# Patient Record
Sex: FEMALE | Race: WHITE | NOT HISPANIC OR LATINO | Employment: OTHER | URBAN - METROPOLITAN AREA
[De-identification: names, ages, dates, MRNs, and addresses within clinical notes are randomized per-mention and may not be internally consistent; named-entity substitution may affect disease eponyms.]

---

## 2018-01-01 ENCOUNTER — HOSPITAL ENCOUNTER (OUTPATIENT)
Facility: MEDICAL CENTER | Age: 83
End: 2018-05-12
Attending: EMERGENCY MEDICINE | Admitting: HOSPITALIST
Payer: MEDICARE

## 2018-01-01 VITALS
HEART RATE: 38 BPM | WEIGHT: 130.07 LBS | HEIGHT: 61 IN | OXYGEN SATURATION: 96 % | RESPIRATION RATE: 10 BRPM | BODY MASS INDEX: 24.56 KG/M2 | TEMPERATURE: 95.2 F

## 2018-01-01 DIAGNOSIS — I44.2 COMPLETE HEART BLOCK (HCC): ICD-10-CM

## 2018-01-01 LAB
ALBUMIN SERPL BCP-MCNC: 2.8 G/DL (ref 3.2–4.9)
ALBUMIN/GLOB SERPL: 0.9 G/DL
ALP SERPL-CCNC: 87 U/L (ref 30–99)
ALT SERPL-CCNC: 6 U/L (ref 2–50)
ANION GAP SERPL CALC-SCNC: 4 MMOL/L (ref 0–11.9)
AST SERPL-CCNC: 39 U/L (ref 12–45)
BASOPHILS # BLD AUTO: 0.1 % (ref 0–1.8)
BASOPHILS # BLD: 0.01 K/UL (ref 0–0.12)
BILIRUB SERPL-MCNC: 0.3 MG/DL (ref 0.1–1.5)
BUN SERPL-MCNC: 51 MG/DL (ref 8–22)
CALCIUM SERPL-MCNC: 7.8 MG/DL (ref 8.5–10.5)
CHLORIDE SERPL-SCNC: 115 MMOL/L (ref 96–112)
CO2 SERPL-SCNC: 19 MMOL/L (ref 20–33)
CREAT SERPL-MCNC: 2.16 MG/DL (ref 0.5–1.4)
EOSINOPHIL # BLD AUTO: 0.02 K/UL (ref 0–0.51)
EOSINOPHIL NFR BLD: 0.3 % (ref 0–6.9)
ERYTHROCYTE [DISTWIDTH] IN BLOOD BY AUTOMATED COUNT: 49.5 FL (ref 35.9–50)
GLOBULIN SER CALC-MCNC: 3.1 G/DL (ref 1.9–3.5)
GLUCOSE SERPL-MCNC: 131 MG/DL (ref 65–99)
HCT VFR BLD AUTO: 31 % (ref 37–47)
HGB BLD-MCNC: 10.2 G/DL (ref 12–16)
IMM GRANULOCYTES # BLD AUTO: 0.05 K/UL (ref 0–0.11)
IMM GRANULOCYTES NFR BLD AUTO: 0.7 % (ref 0–0.9)
INR PPP: 1.04 (ref 0.87–1.13)
LYMPHOCYTES # BLD AUTO: 0.61 K/UL (ref 1–4.8)
LYMPHOCYTES NFR BLD: 8.6 % (ref 22–41)
MCH RBC QN AUTO: 31.9 PG (ref 27–33)
MCHC RBC AUTO-ENTMCNC: 32.9 G/DL (ref 33.6–35)
MCV RBC AUTO: 96.9 FL (ref 81.4–97.8)
MONOCYTES # BLD AUTO: 0.39 K/UL (ref 0–0.85)
MONOCYTES NFR BLD AUTO: 5.5 % (ref 0–13.4)
NEUTROPHILS # BLD AUTO: 6.04 K/UL (ref 2–7.15)
NEUTROPHILS NFR BLD: 84.8 % (ref 44–72)
NRBC # BLD AUTO: 0 K/UL
NRBC BLD-RTO: 0 /100 WBC
PLATELET # BLD AUTO: 157 K/UL (ref 164–446)
PMV BLD AUTO: 10.4 FL (ref 9–12.9)
POTASSIUM SERPL-SCNC: 6.1 MMOL/L (ref 3.6–5.5)
PROT SERPL-MCNC: 5.9 G/DL (ref 6–8.2)
PROTHROMBIN TIME: 13.3 SEC (ref 12–14.6)
RBC # BLD AUTO: 3.2 M/UL (ref 4.2–5.4)
SODIUM SERPL-SCNC: 138 MMOL/L (ref 135–145)
WBC # BLD AUTO: 7.1 K/UL (ref 4.8–10.8)

## 2018-01-01 PROCEDURE — 700102 HCHG RX REV CODE 250 W/ 637 OVERRIDE(OP): Performed by: HOSPITALIST

## 2018-01-01 PROCEDURE — 700105 HCHG RX REV CODE 258: Performed by: EMERGENCY MEDICINE

## 2018-01-01 PROCEDURE — 96375 TX/PRO/DX INJ NEW DRUG ADDON: CPT

## 2018-01-01 PROCEDURE — 80053 COMPREHEN METABOLIC PANEL: CPT

## 2018-01-01 PROCEDURE — 700101 HCHG RX REV CODE 250: Performed by: EMERGENCY MEDICINE

## 2018-01-01 PROCEDURE — 99220 PR INITIAL OBSERVATION CARE,LEVL III: CPT | Performed by: HOSPITALIST

## 2018-01-01 PROCEDURE — 96366 THER/PROPH/DIAG IV INF ADDON: CPT

## 2018-01-01 PROCEDURE — 700111 HCHG RX REV CODE 636 W/ 250 OVERRIDE (IP): Performed by: INTERNAL MEDICINE

## 2018-01-01 PROCEDURE — A9270 NON-COVERED ITEM OR SERVICE: HCPCS | Performed by: HOSPITALIST

## 2018-01-01 PROCEDURE — 36415 COLL VENOUS BLD VENIPUNCTURE: CPT

## 2018-01-01 PROCEDURE — G0378 HOSPITAL OBSERVATION PER HR: HCPCS

## 2018-01-01 PROCEDURE — 99285 EMERGENCY DEPT VISIT HI MDM: CPT

## 2018-01-01 PROCEDURE — 96365 THER/PROPH/DIAG IV INF INIT: CPT

## 2018-01-01 PROCEDURE — 85610 PROTHROMBIN TIME: CPT

## 2018-01-01 PROCEDURE — 85025 COMPLETE CBC W/AUTO DIFF WBC: CPT

## 2018-01-01 RX ORDER — LACTULOSE 20 G/30ML
10 SOLUTION ORAL
Status: DISCONTINUED | OUTPATIENT
Start: 2018-01-01 | End: 2018-01-01 | Stop reason: HOSPADM

## 2018-01-01 RX ORDER — SCOLOPAMINE TRANSDERMAL SYSTEM 1 MG/1
1 PATCH, EXTENDED RELEASE TRANSDERMAL
Status: DISCONTINUED | OUTPATIENT
Start: 2018-01-01 | End: 2018-01-01 | Stop reason: HOSPADM

## 2018-01-01 RX ORDER — BISACODYL 10 MG
10 SUPPOSITORY, RECTAL RECTAL
Status: DISCONTINUED | OUTPATIENT
Start: 2018-01-01 | End: 2018-01-01 | Stop reason: HOSPADM

## 2018-01-01 RX ORDER — ATROPINE SULFATE 10 MG/ML
2 SOLUTION/ DROPS OPHTHALMIC EVERY 4 HOURS PRN
Status: CANCELLED | OUTPATIENT
Start: 2018-01-01

## 2018-01-01 RX ORDER — ONDANSETRON 8 MG/1
8 TABLET, ORALLY DISINTEGRATING ORAL EVERY 8 HOURS PRN
Status: DISCONTINUED | OUTPATIENT
Start: 2018-01-01 | End: 2018-01-01 | Stop reason: HOSPADM

## 2018-01-01 RX ORDER — POLYVINYL ALCOHOL 14 MG/ML
2 SOLUTION/ DROPS OPHTHALMIC EVERY 6 HOURS PRN
Status: DISCONTINUED | OUTPATIENT
Start: 2018-01-01 | End: 2018-01-01 | Stop reason: HOSPADM

## 2018-01-01 RX ORDER — DOCUSATE SODIUM 100 MG/1
100 CAPSULE, LIQUID FILLED ORAL EVERY 12 HOURS
Status: DISCONTINUED | OUTPATIENT
Start: 2018-01-01 | End: 2018-01-01 | Stop reason: HOSPADM

## 2018-01-01 RX ORDER — HALOPERIDOL 5 MG/ML
1 INJECTION INTRAMUSCULAR EVERY 6 HOURS PRN
Status: DISCONTINUED | OUTPATIENT
Start: 2018-01-01 | End: 2018-01-01 | Stop reason: HOSPADM

## 2018-01-01 RX ORDER — HALOPERIDOL 2 MG/ML
1 SOLUTION ORAL EVERY 6 HOURS PRN
Status: DISCONTINUED | OUTPATIENT
Start: 2018-01-01 | End: 2018-01-01 | Stop reason: HOSPADM

## 2018-01-01 RX ORDER — ATROPINE SULFATE 10 MG/ML
2 SOLUTION/ DROPS OPHTHALMIC EVERY 4 HOURS PRN
Status: DISCONTINUED | OUTPATIENT
Start: 2018-01-01 | End: 2018-01-01 | Stop reason: HOSPADM

## 2018-01-01 RX ORDER — CALCIUM CHLORIDE 100 MG/ML
1 INJECTION INTRAVENOUS; INTRAVENTRICULAR ONCE
Status: COMPLETED | OUTPATIENT
Start: 2018-01-01 | End: 2018-01-01

## 2018-01-01 RX ORDER — FAMOTIDINE 20 MG/1
20 TABLET, FILM COATED ORAL DAILY
Status: DISCONTINUED | OUTPATIENT
Start: 2018-01-01 | End: 2018-01-01 | Stop reason: HOSPADM

## 2018-01-01 RX ORDER — ONDANSETRON 2 MG/ML
8 INJECTION INTRAMUSCULAR; INTRAVENOUS EVERY 8 HOURS PRN
Status: DISCONTINUED | OUTPATIENT
Start: 2018-01-01 | End: 2018-01-01 | Stop reason: HOSPADM

## 2018-01-01 RX ADMIN — SCOPALAMINE 1 PATCH: 1 PATCH, EXTENDED RELEASE TRANSDERMAL at 05:28

## 2018-01-01 RX ADMIN — CALCIUM CHLORIDE 1 G: 100 INJECTION, SOLUTION INTRAVENOUS at 23:21

## 2018-01-01 RX ADMIN — DEXMEDETOMIDINE HYDROCHLORIDE 0.2 MCG/KG/HR: 100 INJECTION, SOLUTION INTRAVENOUS at 23:17

## 2018-05-11 PROBLEM — I50.84 END STAGE CONGESTIVE HEART FAILURE (HCC): Status: ACTIVE | Noted: 2018-01-01

## 2018-05-11 PROBLEM — F02.80 DEMENTIA DUE TO PARKINSON'S DISEASE WITHOUT BEHAVIORAL DISTURBANCE (HCC): Status: ACTIVE | Noted: 2018-01-01

## 2018-05-11 PROBLEM — F20.1 DISORGANIZED SCHIZOPHRENIA (HCC): Status: ACTIVE | Noted: 2018-01-01

## 2018-05-11 PROBLEM — N18.4 CHRONIC RENAL FAILURE, STAGE 4 (SEVERE) (HCC): Status: ACTIVE | Noted: 2018-01-01

## 2018-05-11 PROBLEM — G70.00 MG (MYASTHENIA GRAVIS) (HCC): Status: ACTIVE | Noted: 2018-01-01

## 2018-05-11 PROBLEM — I44.2 COMPLETE HEART BLOCK (HCC): Status: ACTIVE | Noted: 2018-01-01

## 2018-05-11 PROBLEM — G20.A1 DEMENTIA DUE TO PARKINSON'S DISEASE WITHOUT BEHAVIORAL DISTURBANCE (HCC): Status: ACTIVE | Noted: 2018-01-01

## 2018-05-12 PROBLEM — Z51.5 COMFORT MEASURES ONLY STATUS: Status: ACTIVE | Noted: 2018-01-01

## 2018-05-12 PROBLEM — E83.51 HYPOCALCEMIA: Status: ACTIVE | Noted: 2018-01-01

## 2018-05-12 NOTE — PROGRESS NOTES
Called Alana Payan ([phone]) per request of Dr. Caballero and Dr. Mcgregor to locate medical decision maker for patient. Alana confirms to me that she is the medical decision maker. Phone number given to Dr. Caballero to call her. Spoke with Alana again to have her fax paperwork designating her as medical decision maker. She informs me that [facility] has all of the paperwork and that she will call them and have them fax it to us.

## 2018-05-12 NOTE — ASSESSMENT & PLAN NOTE
This patient presents critically ill in heart block on transcutaneous pacer  Cardiology evaluated the patient was not a candidate for pacemaker placement at this time  This is end-of-life for the patient this was discussed with Alana Payan the patient's medical decision maker she agrees with the plan. Patient will be made comfort measures.

## 2018-05-12 NOTE — H&P
Hospital Medicine History and Physical    Date of Service  5/12/2018    Chief Complaint  Chief Complaint   Patient presents with   • Bradycardia       History of Presenting Illness  86 y.o. female who presented 5/11/2018 with [facility] transcutaneous pacer on a ketamine drip for evaluation of symptomatic bradycardia started earlier this morning. Patient is in a long-term care center in [facility] has been there for about 11 years. She was found unresponsive earlier this evening no pulse. They completed a 5-6 chest compressions with return of pulse. Found with symptomatic bradycardia with a complete heart block. Altered at baseline found to be more altered upon ER evaluation. She also had some seizure-like activity as well. Given atropine ×2 without any effect. Started on transcutaneous pacing as well as a ketamine drip transferred for cardiology evaluation at [facility]. Cardiology team no intervention at this time. No further history is able to be obtained secondary to patient's mental status.   Primary Care Physician  Pcp Pt States None    Consultants  Cardiology    Code Status  DNR    Review of Systems  Review of Systems   Unable to perform ROS: Medical condition        Past Medical History  Past Medical History:   Diagnosis Date   • MI (myocardial infarction) ([facility]) 1995   • Ataxia    • CAD (coronary artery disease)    • Dementia    • Diabetes    • DVT (deep vein thrombosis) in pregnancy ([facility])    • Hard of hearing    • Hypertension    • Hypothyroid    • Myasthenia gravis    • Osteoporosis    • Parkinson's disease    • Renal disorder    • Teeth missing        Surgical History  Past Surgical History:   Procedure Laterality Date   • IVC FILTER-CATH LAB         Medications  No current facility-administered medications on file prior to encounter.      Current Outpatient Prescriptions on File Prior to Encounter   Medication Sig Dispense Refill   • risperidone (RISPERDAL) 0.5 MG TABS Take 0.5  mg by mouth 2 times a day.     • levothyroxine (SYNTHROID) 50 MCG TABS Take 50 mcg by mouth every morning. 30 mins before breakfast      • benazepril-hydrochlorthiazide (LOTENSIN HCT) 10-12.5 MG per tablet Take 1 Tab by mouth every day.     • pravastatin (PRAVACHOL) 20 MG TABS Take 20 mg by mouth every day.     • Cyanocobalamin (VITAMIN B 12 PO) Take 1 Tab by mouth every day. Indications: **OTC**     • calcitonin, salmon, (MIACALCIN) 200 UNIT/ACT SOLN Spray 1 Spray in nose every day.     • calcium polycarbophil (FIBER-LAX) 625 MG TABS Take 625 mg by mouth 2 Times a Day.     • pyridostigmine (MESTINON) 60 MG TABS Take 60 mg by mouth 2 Times a Day.     • metformin (GLUCOPHAGE) 500 MG TABS Take 500 mg by mouth 3 times a day.     • carbidopa-levodopa (SINEMET)  MG TABS Take 1 Tab by mouth 4 times a day.     • acetaminophen (TYLENOL) 325 MG TABS Take 650 mg by mouth every 6 hours as needed. For mild to moderate pain or temperature greater than 100      • magnesium hydroxide (MILK OF MAGNESIA) 400 MG/5ML SUSP Take 30 mL by mouth 1 time daily as needed. For no BM after 48 hours          Family History  History reviewed. No pertinent family history.    Social History  Social History   Substance Use Topics   • Smoking status: Unknown If Ever Smoked   • Smokeless tobacco: Not on file   • Alcohol use No       Allergies  Allergies   Allergen Reactions   • Codeine         Physical Exam  Laboratory   Hemodynamics  Temp (24hrs), Av.9 °C (96.7 °F), Min:35.9 °C (96.7 °F), Max:35.9 °C (96.7 °F)   Temperature: 35.9 °C (96.7 °F)  Pulse  Av  Min: 52  Max: 70 Heart Rate (Monitored): 66  NIBP: 116/69      Respiratory      Respiration: 18, Pulse Oximetry: 99 %             Physical Exam  Constitutional: Well developed, Well nourished, arrives on a pacer and ketamine drip  HENT: Normocephalic, Atraumatic, Bilateral external ears normal  Eyes: PERRLA, Conjunctiva normal, No discharge.   Neck: Supple, No stridor.   Lymphatic: No  lymphadenopathy noted.   Cardiovascular: Normal heart rate, Normal rhythm.   Thorax & Lungs: Clear to auscultation bilaterally, No respiratory distress, No wheezing, No crackles.   Abdomen: Soft, No masses, No pulsatile masses.   Skin: Warm, Dry, No erythema, No rash.   Extremities:, No edema No cyanosis.   Musculoskeletal: No major deformities noted.  Intact distal pulses  Neurologic: unable to be assessed secondary to ketamine drip  Recent Labs      05/11/18 2307   WBC  7.1   RBC  3.20*   HEMOGLOBIN  10.2*   HEMATOCRIT  31.0*   MCV  96.9   MCH  31.9   MCHC  32.9*   RDW  49.5   PLATELETCT  157*   MPV  10.4     Recent Labs      05/11/18 2307   SODIUM  138   POTASSIUM  6.1*   CHLORIDE  115*   CO2  19*   GLUCOSE  131*   BUN  51*   CREATININE  2.16*   CALCIUM  7.8*     Recent Labs      05/11/18 2307   ALTSGPT  6   ASTSGOT  39   ALKPHOSPHAT  87   TBILIRUBIN  0.3   GLUCOSE  131*     Recent Labs      05/11/18 2307   INR  1.04             Lab Results   Component Value Date    TROPONINI 0.02 12/06/2012     Urinalysis:  No results found for: SPECGRAVITY, GLUCOSEUR, KETONES, NITRITE, WBCURINE, RBCURINE, BACTERIA, EPITHELCELL     Imaging  none   Assessment/Plan     I anticipate this patient is appropriate for observation status at this time.    * Comfort measures only status   Assessment & Plan    This patient presents critically ill in heart block on transcutaneous pacer  Cardiology evaluated the patient was not a candidate for pacemaker placement at this time  This is end-of-life for the patient this was discussed with Alana Payan the patient's medical decision maker she agrees with the plan. Patient will be made comfort measures.        Hypocalcemia   Assessment & Plan    Replaced in the emergency department        Dementia due to Parkinson's disease without behavioral disturbance (HCC)   Assessment & Plan    History of dementia        Disorganized schizophrenia (HCC)   Assessment & Plan    Chronic        Chronic  renal failure, stage 4 (severe) ([facility])   Assessment & Plan    Chronic medical problems        Complete heart block ([facility])   Assessment & Plan    Dr. Mckenzie from cardiology consulted no intervention at this time            VTE prophylaxis: None

## 2018-05-12 NOTE — ED NOTES
Tram fall assessment completed. Pt is High risk for fall. Interventions complete. Pt placed in yellow non slip socks, wrist band placed, green sign on door. Bed locked in low position, call light in place. Personal possessions in place.  Personal needs assessed. Safety assessed. Will monitor frequently.

## 2018-05-12 NOTE — ED PROVIDER NOTES
ED Provider Note    Scribed for Dr. Jose Guadalupe Mcgregor M.D. by Deedee Matt. 5/11/2018  10:55 PM    Primary care provider: Pcp Pt States None  Means of arrival: ambulance  History obtained from: EMS  History limited by: patient's acute medical status    CHIEF COMPLAINT  Chief Complaint   Patient presents with   • Bradycardia       HPI  Alyx Rivas is a 86 y.o. female with a history of CAD, dementia, diabetes, DBT, hypertension, MI, parkinson's, renal disorder who presents to the Emergency Department as a transfer from [facility], on a transcutaneous pacer, on a Ketamine drip for evaluation of symptomatic bradycardia that began earlier this evening. Patient is in the long term care section of [facility]( has been for 11 years), found unresponsive earlier this evening with no pulse. They completed 5-6 chest compressions until patient's pulse returned. Patient was then transferred downstairs to the ER and found to be symptomatically bradycardic with a complete heart block. Patient is altered at baseline, however, was found to be more altered upon ER evaluation and staff witnessed seizure like activity as well. 0.5 x2 atropine was given with no effect.They began pacing her and started her on a Ketamine drip, transferring her here. Patient has been paced for approximately 2 hours prior to arrival in out ED.    Further history limited secondary to patient's acute medical status.    REVIEW OF SYSTEMS  Pertinent positives include symptomatic bradycardia, altered. ROS limited secondary to patient's acute medical status  See HPI for further details.     PAST MEDICAL HISTORY   has a past medical history of Ataxia; CAD (coronary artery disease); Dementia; Diabetes; DVT (deep vein thrombosis) in pregnancy ([facility]); Hard of hearing; Hypertension; Hypothyroid; MI (myocardial infarction) ([facility]) (1995); Myasthenia gravis; Osteoporosis; Parkinson's disease; Renal disorder; and Teeth missing.    SURGICAL HISTORY   has a  "past surgical history that includes ivc filter-cath lab.    SOCIAL HISTORY  Social History   Substance Use Topics   • Smoking status: Unknown If Ever Smoked   • Alcohol use No      History   Drug Use No       FAMILY HISTORY  History reviewed. No pertinent family history.    CURRENT MEDICATIONS  Home Medications     Reviewed by Maico Lara R.N. (Registered Nurse) on 05/11/18 at 2307  Med List Status: Not Addressed   Medication Last Dose Status   acetaminophen (TYLENOL) 325 MG TABS 12/7/2012 Active   benazepril-hydrochlorthiazide (LOTENSIN HCT) 10-12.5 MG per tablet 12/7/2012 Active   calcitonin, salmon, (MIACALCIN) 200 UNIT/ACT SOLN 12/7/2012 Active   calcium polycarbophil (FIBER-LAX) 625 MG TABS 12/7/2012 Active   carbidopa-levodopa (SINEMET)  MG TABS 12/7/2012 Active   Cyanocobalamin (VITAMIN B 12 PO) 12/7/2012 Active   levothyroxine (SYNTHROID) 50 MCG TABS 12/7/2012 Active   magnesium hydroxide (MILK OF MAGNESIA) 400 MG/5ML SUSP 12/7/2012 Active   metformin (GLUCOPHAGE) 500 MG TABS 12/7/2012 Active   pravastatin (PRAVACHOL) 20 MG TABS 12/7/2012 Active   pyridostigmine (MESTINON) 60 MG TABS 12/7/2012 Active   risperidone (RISPERDAL) 0.5 MG TABS 12/7/2012 Active                ALLERGIES  Allergies   Allergen Reactions   • Codeine        PHYSICAL EXAM  VITAL SIGNS: Pulse 70   Temp 35.9 °C (96.7 °F)   Resp 12   Ht 1.549 m (5' 1\")   Wt 59 kg (130 lb 1.1 oz)   SpO2 93%   BMI 24.58 kg/m²     Constitutional: Well developed, Well nourished, arrives on a pacer and ketamine drip  HENT: Normocephalic, Atraumatic, Bilateral external ears normal  Eyes: PERRLA, Conjunctiva normal, No discharge.   Neck: Supple, No stridor.   Lymphatic: No lymphadenopathy noted.   Cardiovascular: Normal heart rate, Normal rhythm.   Thorax & Lungs: Clear to auscultation bilaterally, No respiratory distress, No wheezing, No crackles.   Abdomen: Soft, No masses, No pulsatile masses.   Skin: Warm, Dry, No erythema, No rash. "   Extremities:, No edema No cyanosis.   Musculoskeletal: No major deformities noted.  Intact distal pulses  Neurologic: unable to be assessed secondary to ketamine drip    LABS  Results for orders placed or performed during the hospital encounter of 05/11/18   CBC WITH DIFFERENTIAL   Result Value Ref Range    WBC 7.1 4.8 - 10.8 K/uL    RBC 3.20 (L) 4.20 - 5.40 M/uL    Hemoglobin 10.2 (L) 12.0 - 16.0 g/dL    Hematocrit 31.0 (L) 37.0 - 47.0 %    MCV 96.9 81.4 - 97.8 fL    MCH 31.9 27.0 - 33.0 pg    MCHC 32.9 (L) 33.6 - 35.0 g/dL    RDW 49.5 35.9 - 50.0 fL    Platelet Count 157 (L) 164 - 446 K/uL    MPV 10.4 9.0 - 12.9 fL    Neutrophils-Polys 84.80 (H) 44.00 - 72.00 %    Lymphocytes 8.60 (L) 22.00 - 41.00 %    Monocytes 5.50 0.00 - 13.40 %    Eosinophils 0.30 0.00 - 6.90 %    Basophils 0.10 0.00 - 1.80 %    Immature Granulocytes 0.70 0.00 - 0.90 %    Nucleated RBC 0.00 /100 WBC    Neutrophils (Absolute) 6.04 2.00 - 7.15 K/uL    Lymphs (Absolute) 0.61 (L) 1.00 - 4.80 K/uL    Monos (Absolute) 0.39 0.00 - 0.85 K/uL    Eos (Absolute) 0.02 0.00 - 0.51 K/uL    Baso (Absolute) 0.01 0.00 - 0.12 K/uL    Immature Granulocytes (abs) 0.05 0.00 - 0.11 K/uL    NRBC (Absolute) 0.00 K/uL   COMP METABOLIC PANEL   Result Value Ref Range    Sodium 138 135 - 145 mmol/L    Potassium 6.1 (H) 3.6 - 5.5 mmol/L    Chloride 115 (H) 96 - 112 mmol/L    Co2 19 (L) 20 - 33 mmol/L    Anion Gap 4.0 0.0 - 11.9    Glucose 131 (H) 65 - 99 mg/dL    Bun 51 (H) 8 - 22 mg/dL    Creatinine 2.16 (H) 0.50 - 1.40 mg/dL    Calcium 7.8 (L) 8.5 - 10.5 mg/dL    AST(SGOT) 39 12 - 45 U/L    ALT(SGPT) 6 2 - 50 U/L    Alkaline Phosphatase 87 30 - 99 U/L    Total Bilirubin 0.3 0.1 - 1.5 mg/dL    Albumin 2.8 (L) 3.2 - 4.9 g/dL    Total Protein 5.9 (L) 6.0 - 8.2 g/dL    Globulin 3.1 1.9 - 3.5 g/dL    A-G Ratio 0.9 g/dL   PROTHROMBIN TIME   Result Value Ref Range    PT 13.3 12.0 - 14.6 sec    INR 1.04 0.87 - 1.13   ESTIMATED GFR   Result Value Ref Range    GFR If   American 26 (A) >60 mL/min/1.73 m 2    GFR If Non African American 22 (A) >60 mL/min/1.73 m 2       All labs reviewed by me.    COURSE & MEDICAL DECISION MAKING  Pertinent Labs & Imaging studies reviewed. (See chart for details)    I reviewed patient's transferring facility paperwork for comparison: RBC 3.67, Hemoglobin 11.4, Hematocrit 34.2, K+ 6.1, Cl 21, anion gap 22 glucose 146, urea nitrogen 59, creatinine 2.5, BUN/creatinine ratio 23.6, AST 42, Troponin 0.28, . Patient has resided at [facility] for [facility] years.    10:55 PM - Patient seen and examined at bedside. Patient will be treated with 1g CaCl. Ordered CBC, CMP, prothrombin time to evaluate her symptoms.     10:58 PM ketamine drip discontinue Precedex drip started.    11:00 PM Patient switched from EMS pacer, to [facility] Pacer    11:05 PM Paged Cardiology    11:12 PM Spoke with Dr. Mckenzie, Cardiology, about the patient's condition. Agrees to come evaluate the patient.    11:18 PM Dr. Mckenzie, Cardiology at bedside. Advises completing repeat labs after electrolyte correction.    11:49 PM Spoke with Dr. Caballero, Hospitalist, about the patient's condition. We discussed admission and possible 2 physician switch from Full Code to DNR. We will go and discuss with social work. They will consult with the patient's conservator. Dr. Caballero, Hospitalist agrees to admit.    CRITICAL CARE  The very real possibility of a deterioration of this patient's condition required the highest level of my preparedness for sudden, emergent intervention.  I provided critical care services, which included medication orders, frequent reevaluations of the patient's condition and response to treatment, ordering and reviewing test results, and discussing the case with various consultants.  The critical care time associated with the care of the patient was 30 minutes. Review chart for interventions. This time is exclusive of any other billable procedures.     Decision Making:  Patient was  transferred to this facility with complete heart block and with transcutaneous pacemaker in place and in use, with the thought that permanent pacemaker with need to be placed however she does not seem to be a candidate for that she is significant dementia and decline in function after evaluation by cardiologist as she was treated medically for hyperkalemia but it was felt that only comfort care measures were likely indicated.  Hospitalist was able to contact the patient's power of  patient will be admitted for comfort care    DISPOSITION:  Patient will be admitted to Dr. Caballero, Hospitalist in critical condition.    FINAL IMPRESSION  Complete heart block  30 minutes CCT     Deedee HECTOR (Scribe), am scribing for, and in the presence of, Jose Guadalupe Mcgregor M.D..    Electronically signed by: Deedee Matt (Scribe), 5/11/2018    Jose Guadalupe HECTOR M.D. personally performed the services described in this documentation, as scribed by Deedee Matt in my presence, and it is both accurate and complete.    The note accurately reflects work and decisions made by me.  Jose Guadalupe Mcgregor  5/12/2018  12:48 AM

## 2018-05-12 NOTE — PROGRESS NOTES
Pt to room 315 Bed 2. Pt comfort care. Pt with cheyne ruano breathing. HR in 30s; bp unreadable on machine; oxygen 52% on room air.  Pt currently resting in bed; pt obtunded. Cool to touch. Appears comfortable. Bed alarm on.

## 2018-05-12 NOTE — PROGRESS NOTES
Pt obtunded. Q 2 turns in place. IO to RLE CDI. Q 2 turns in place. No s/s of pain or distress. Frequent assessment in place.

## 2018-05-12 NOTE — CONSULTS
"  Cardiology consultation   asking for consultation: Dr. Sommer Arana  Reason for consultation: Heart block    HPI:    History from her hospital chart here from 2012 as well as the recent note from the doctor's office near her group home in [facility]    Her sad history is illustrated in her hospital chart here and in the correspondence from her outpatient doctor outside the group home. She is a . She was born and raised there and with her schizophrenia and near deafness she still feels that she hears the voices of her ancestors calling her home. She has been telling him that she has been preparing to die.  Because she has no close relations, she is a [place] of the state. She has been in group homes collectively for more than 15 years.  His notes state for that reason she is \"full code\".  She was transferred here for sinus bradycardia in 2012. At that time, the internal medicine team thought with her advanced dementia and history of stroke with debility and schizophrenia she would never be a candidate for pacemaker and that it would not improve the quality of her life.     Labs were checked at the outside hospital today. Her potassium was 6.1. No treatment. Creatinine 2.5  Hard to believe, but she was transferred here and a ketamine drip going through an interosseous in her shin bone transcutaneously pacing and she is still unresponsive.    Past Medical History:  has a past medical history of Ataxia; CAD (coronary artery disease); Dementia; Diabetes; DVT (deep vein thrombosis) in pregnancy ([facility]); Hard of hearing; Hypertension; Hypothyroid; MI (myocardial infarction) ([facility]) (1995); Myasthenia gravis; Osteoporosis; Parkinson's disease; Renal disorder; and Teeth missing. Stroke due to posterior fossa bleed,  shunt, chronic right bundle branch block  Past Surgical History:  has a past surgical history that includes ivc filter-cath lab.  Past Social History:  reports that she does not " "drink alcohol or use drugs.  Past Family History: History reviewed. No pertinent family history.  Allergies: Codeine    Current Medications:  Prior to Admission medications    Medication Sig Start Date End Date Taking? Authorizing Provider   risperidone (RISPERDAL) 0.5 MG TABS Take 0.5 mg by mouth 2 times a day.    Physician Outpatient   levothyroxine (SYNTHROID) 50 MCG TABS Take 50 mcg by mouth every morning. 30 mins before breakfast     Physician Outpatient   benazepril-hydrochlorthiazide (LOTENSIN HCT) 10-12.5 MG per tablet Take 1 Tab by mouth every day.    Physician Outpatient   pravastatin (PRAVACHOL) 20 MG TABS Take 20 mg by mouth every day.    Physician Outpatient   Cyanocobalamin (VITAMIN B 12 PO) Take 1 Tab by mouth every day. Indications: **OTC**    Physician Outpatient   calcitonin, salmon, (MIACALCIN) 200 UNIT/ACT SOLN Spray 1 Spray in nose every day.    Physician Outpatient   calcium polycarbophil (FIBER-LAX) 625 MG TABS Take 625 mg by mouth 2 Times a Day.    Physician Outpatient   pyridostigmine (MESTINON) 60 MG TABS Take 60 mg by mouth 2 Times a Day.    Physician Outpatient   metformin (GLUCOPHAGE) 500 MG TABS Take 500 mg by mouth 3 times a day.    Physician Outpatient   carbidopa-levodopa (SINEMET)  MG TABS Take 1 Tab by mouth 4 times a day.    Physician Outpatient   acetaminophen (TYLENOL) 325 MG TABS Take 650 mg by mouth every 6 hours as needed. For mild to moderate pain or temperature greater than 100     Physician Outpatient   magnesium hydroxide (MILK OF MAGNESIA) 400 MG/5ML SUSP Take 30 mL by mouth 1 time daily as needed. For no BM after 48 hours     Physician Outpatient       Review of Systems:  Review of Systems   Unable to perform ROS: Medical condition     Pulse (!) 52, temperature 35.9 °C (96.7 °F), resp. rate 12, height 1.549 m (5' 1\"), weight 59 kg (130 lb 1.1 oz), SpO2 100 %.    Physical Examination:  Physical Exam   Constitutional:   Ketamine drip is off, she orients vaguely to " sound, pupils are nonfocused, no purposeful movements chronic spastic changes, chronically ill.   HENT:   Head: Atraumatic.   Eyes: Left eye exhibits no discharge. No scleral icterus.   Neck: Neck supple. No JVD present. No tracheal deviation present.   Cardiovascular:   No murmur heard.  Transcutaneously paced no underlying rhythm ventricular beats   Abdominal: Soft. She exhibits no distension.   Well-healed transabdominal scar   Musculoskeletal: She exhibits no edema.   Large interosseous IV in her left shin   Lymphadenopathy:     She has no cervical adenopathy.   Skin: Skin is warm and dry.     Data:    12 EKG from the outside hospital reveal broad QRS more than 200 ms and a right bundle pattern with complete heart block and ventricular complexes and right bundle pattern  Potassium 6.1 creatinine 2.2 albumin 2.8  White count 7.1 with normal differential hemoglobin 10.2    Impression:    End-of-life  End-stage heart failure  Complete heart block compounded by hyperkalemia and renal failure  Advanced dementia  Pain from treatment  Now far from her ancestral and current home    Plan:    There is no cardiac intervention that I can think of that would not cause her more pain and suffering, or advance the quality of her life.    Follow potassium and it reversed if possible with agents such as calcium if not painful to her  Manage the pain in her lower leg where the IV was put into her tibia  Provide comfort and reduce stimulus    Discussed with Dr. Caballero and Dr. Mcgregor in the emergency room.  This case illustrates futility and comfort care by all means is in order as illustrated above  Will consult palliative care as well but until then please don't hesitate to call if there is a decline in her status, we will do everything medically to preserve her dignity and comfort    Thank you kindly

## 2018-05-12 NOTE — PROGRESS NOTES
Spoke with Duane at [facility] who faxed me the Letters of Conservatorship from [facility] office giving Alana Schuyler power to make medical decisions for patient. Informed Dr. Caballero of receiving these documents. Documents placed in patient's chart.

## 2018-05-12 NOTE — PALLIATIVE CARE
Palliative Care   Per chart review [facility] already established, pt on comfort care and appears imminent. Will cancel order, please re-consult if any further needs arise.        Thank you for allowing Palliative Care to participate in this patient's care. Please feel free to call [phone] with any questions or concerns.

## 2018-05-12 NOTE — PROGRESS NOTES
" contact this RN with potential phone numbers of brother Mata. One number was disconnected and the other was for a \"[place]\". NAM, SW and  notified of inability to contact next of kin. Pt potential skin donor. Put on transport to [facility] for refrigeration.   "

## 2018-05-12 NOTE — DISCHARGE SUMMARY
Hospital Medicine Death Summary    Date of Note  5/12/2018    Primary Care Physician  Pcp Pt States None    Death Date: 05/12/18  Death Time: 1416    Cause of Death  Cardiopulmonary arrest secondary to Complete heart block.    Comorbid Conditions  Patient Active Problem List   Diagnosis   • Hyperglycemia   • Right bundle branch block   • Complete heart block (HCC)   • Chronic renal failure, stage 4 (severe) (HCC)   • Disorganized schizophrenia (HCC)   • Dementia due to Parkinson's disease without behavioral disturbance (HCC)   • Comfort measures only status   • Hypocalcemia       History of Presenting Illness and Hospital Course  86 y.o. female admitted 5/11/2018 with significant dementia and previous history of heart block, presented from OSH for cardiology consult after she was found unresponsive at her nursing home which she has resided at for 11 years.  Chest compressions were given at the facility prior to transfer with return of pulse.  Patient in complete heart block with symptomatic bradycardia.  She was started on transcutaneous pacing and ketamine drip and transferred to [facility].  Dr Mckenzie with cardiology assessed patient and because of her severe dementia, history of stroke and schizophrenia she was deemed not a candidate for pacemaker back in 2012 let alone now with progression of these issues.  No cardiac intervention available that would not cause more pain and suffering and would not improve her quality of life.  Patient transitioned to comfort care and placed on the CNU.  Patient was pronounced by 2 RNs with time of death at 1416.    Consultants  Cardiology - Jonah    Procedures  none

## 2018-05-12 NOTE — ED NOTES
Pt transferred from [facility]. Pt lives at a SNF, non-verbal at baseline, found to be more altered than normal. At ER, pt was in 3rd degree heart block with rate of 30. At approximately 2100 pt was give 0.5 atropine x2 with no effect. Pt was transcutaneously paced at a rate of 75bpm and 90mA. BP and pulses good upon arrival w/ pacing.  Pt arrive on a ketamine gtt at 1mcg/kg. Gtt dc'd upon arrival.     ERP at bedside.     Pt pacing at 70HR, 80 mA with effective capture.

## 2018-05-12 NOTE — PROGRESS NOTES
Pt passed at 1416 by this RN and Aundrea Crocker RN. Tiffany Payan, medical POA, notified of pt's death at 1449. [facility] notified at 1444. Dr Rodriguez notified at 1431. , Wolf Menendez, notified at 1432. Attempted to notify pt's brother Adolfo of pt's passing but phone number on file disconnected. Tiffany Payan notified and stated she will verify the phone numbers that she has for next of kin. Nadia Alba, tissue bank, notified at 1453. Stated she will refer this to [facility]. Awaiting return call from Tiffany Payan.